# Patient Record
Sex: FEMALE | Race: WHITE
[De-identification: names, ages, dates, MRNs, and addresses within clinical notes are randomized per-mention and may not be internally consistent; named-entity substitution may affect disease eponyms.]

---

## 2020-01-04 ENCOUNTER — HOSPITAL ENCOUNTER (EMERGENCY)
Dept: HOSPITAL 75 - ER | Age: 25
Discharge: HOME | End: 2020-01-04
Payer: SELF-PAY

## 2020-01-04 VITALS — DIASTOLIC BLOOD PRESSURE: 78 MMHG | SYSTOLIC BLOOD PRESSURE: 132 MMHG

## 2020-01-04 VITALS — WEIGHT: 138.89 LBS | BODY MASS INDEX: 21.3 KG/M2 | HEIGHT: 67.72 IN

## 2020-01-04 DIAGNOSIS — Z87.891: ICD-10-CM

## 2020-01-04 DIAGNOSIS — O99.611: Primary | ICD-10-CM

## 2020-01-04 DIAGNOSIS — Z3A.01: ICD-10-CM

## 2020-01-04 DIAGNOSIS — K02.9: ICD-10-CM

## 2020-01-04 PROCEDURE — 99283 EMERGENCY DEPT VISIT LOW MDM: CPT

## 2020-01-04 NOTE — ED EENT
History of Present Illness


General


Chief Complaint:  Dental Problems/Pain


Stated Complaint:  TOOTHACHE 4 1/2 WKS PREGNANT


Nursing Triage Note:  


PT PRESENTS TO ED WITH COMPLAINTS OF L UPPER DENTAL PAIN STARTING AT 1600. PT 


REPORTS SHE HAS HAD ISSUES WITH HER TEETH BUT HAS BEEN UNBALE TO GET AN 


APPOINTMENT AT THE DENTAL CLINIC. PT ALSO REPORTS BEING APROX 5 WEEKS PREG


Source:  patient


Exam Limitations:  no limitations





History of Present Illness


Date Seen by Provider:  Jan 4, 2020


Time Seen by Provider:  18:05


Initial Comments


To ER with left lower dental pain since earlier today. She's had a broken left 

molar for quite some time but only became painful today. Additionally she is 

about 5 weeks pregnant.


Timing/Duration:  abrupt


Severity:  moderate


Location:  dental


Associated Symptoms:  denies symptoms





Allergies and Home Medications


Allergies


Coded Allergies:  


     No Known Drug Allergies (Unverified , 8/1/11)





Home Medications


No Active Prescriptions or Reported Meds





Patient Home Medication List


Home Medication List Reviewed:  Yes





Review of Systems


Review of Systems


Constitutional:  see HPI


Eyes:  No Symptoms Reported


Ears:  No Symptoms Reported


Nose:  no symptoms reported


Mouth:  see HPI, pain


Throat:  no symptoms reported


Respiratory:  no symptoms reported


Cardiovascular:  no symptoms reported


Musculoskeletal:  no symptoms reported





Past Medical-Social-Family Hx


Patient Social History


Alcohol Use:  Denies Use


Recreational Drug Use:  No


Smoking Status:  Former Smoker


Type Used:  Cigarettes


Former Smoker, Quit:  Jan 4, 2020


Recent Foreign Travel:  No


Contact w/Someone Who Travel:  No


Recent Infectious Disease Expo:  No


Recent Hopitalizations:  No


Physical Abuse:  No


Sexual Abuse:  No


Mistreated:  No


Fear:  No





Immunizations Up To Date


Tetanus Booster (TDap):  Unknown


PED Vaccines UTD:  Yes


Date of Influenza Vaccine:  Oct 21, 2015





Seasonal Allergies


Seasonal Allergies:  No





Past Medical History


Surgeries:  No


Respiratory:  No


Cardiac:  No


Neurological:  No


Reproductive Disorders:  No


Female Reproductive Disorders:  Denies


Sexually Transmitted Disease:  No


HIV/AIDS:  No


Genitourinary:  No


Gastrointestinal:  No


Musculoskeletal:  No


Endocrine:  No


Cancer:  No


Psychosocial:  No


Integumentary:  No


Blood Disorders:  No


Adverse Reaction/Blood Tranf:  No





Family Medical History





FH: bipolar disorder


  19 MOTHER


FH: brain tumor


  18 FATHER


Thyroid disease


  G8 SISTER





Physical Exam


Vital Signs





Vital Signs - First Documented








 1/4/20





 17:46


 


Temp 35.8


 


Pulse 127


 


Resp 18


 


B/P (MAP) 156/97 (116)


 


Pulse Ox 97








Height, Weight, BMI


Height: 5'7"


Weight: 160lbs. 0.0oz. [id number]; 21.00 BMI


Method:Stated


General Appearance:  WD/WN, no apparent distress


Eyes:  bilateral eye normal inspection, bilateral eye PERRL, bilateral eye EOMI


Ears:  bilateral ear auricle normal, bilateral ear canal normal, bilateral ear 

TM normal


Mouth/Throat:  normal mouth inspection, other (carious tender left molar lower. 

No surrounding abscess that is palpable)


Neck:  non-tender, full range of motion


Cardiovascular:  regular rate, rhythm, no murmur


Respiratory:  normal breath sounds, no respiratory distress, no accessory muscle

use


Gastrointestinal:  normal bowel sounds, non tender


Neurologic/Psychiatric:  alert, normal mood/affect, oriented x 3


Skin:  normal color, warm/dry


I offered her a inferior alveolar nerve block which she accepted, bupivacaine 

0.5% without epinephrine 2 mL was used.





Progress/Results/Core Measures


Results/Orders


My Orders





Orders - YAMINI PRICE


Amoxicillin Capsule (Polymox Capsule) (1/4/20 18:03)


Rx-Acetaminophen/Codeine (Rx-Tylenol #3) (1/4/20 18:15)





Vital Signs/I&O











 1/4/20





 17:46


 


Temp 35.8


 


Pulse 127


 


Resp 18


 


B/P (MAP) 156/97 (116)


 


Pulse Ox 97














Blood Pressure Mean:                    116











Departure


Impression





   Primary Impression:  


   Dental caries extending into dentine


   Additional Impression:  


   Pain, dental


Disposition:  01 HOME, SELF-CARE


Condition:  Improved





Departure-Patient Inst.


Referrals:  


NO,LOCAL PHYSICIAN (PCP/Family)


Primary Care Physician


Patient Instructions:  Dental Pain





Add. Discharge Instructions:  


1. Use the wax provided to cover this open area of the broken tooth as needed. 

Call your dentist Monday for follow-up. Antibiotics and pain medication in the 

meantime.





All discharge instructions reviewed with patient and/or family. Voiced 

understanding.


Scripts


Acetaminophen with Codeine (Tylenol with Codeine #3 Tablet) 1 Each Tablet


1 EACH PO Q6H PRN for PAIN-SEVERE (8-10) for 7 Days, #10 TAB


   Prov: YAMINI PRICE         1/4/20 


Amoxicillin (Amoxicillin) 500 Mg Capsule


500 MG PO TID, #21 CAP 0 Refills


   Prov: YAMINI PRICE         1/4/20











YAMINI PRICE              Jan 4, 2020 18:10

## 2020-08-07 ENCOUNTER — HOSPITAL ENCOUNTER (INPATIENT)
Dept: HOSPITAL 75 - WSO | Age: 25
LOS: 2 days | Discharge: HOME | End: 2020-08-09
Attending: OBSTETRICS & GYNECOLOGY | Admitting: OBSTETRICS & GYNECOLOGY
Payer: MEDICAID

## 2020-08-07 VITALS — SYSTOLIC BLOOD PRESSURE: 121 MMHG | DIASTOLIC BLOOD PRESSURE: 68 MMHG

## 2020-08-07 VITALS — DIASTOLIC BLOOD PRESSURE: 82 MMHG | SYSTOLIC BLOOD PRESSURE: 120 MMHG

## 2020-08-07 VITALS — SYSTOLIC BLOOD PRESSURE: 113 MMHG | DIASTOLIC BLOOD PRESSURE: 72 MMHG

## 2020-08-07 VITALS — DIASTOLIC BLOOD PRESSURE: 60 MMHG | SYSTOLIC BLOOD PRESSURE: 116 MMHG

## 2020-08-07 VITALS — DIASTOLIC BLOOD PRESSURE: 55 MMHG | SYSTOLIC BLOOD PRESSURE: 113 MMHG

## 2020-08-07 VITALS — SYSTOLIC BLOOD PRESSURE: 112 MMHG | DIASTOLIC BLOOD PRESSURE: 69 MMHG

## 2020-08-07 VITALS — SYSTOLIC BLOOD PRESSURE: 118 MMHG | DIASTOLIC BLOOD PRESSURE: 77 MMHG

## 2020-08-07 VITALS — BODY MASS INDEX: 28.13 KG/M2 | HEIGHT: 67.99 IN | WEIGHT: 185.63 LBS

## 2020-08-07 VITALS — SYSTOLIC BLOOD PRESSURE: 113 MMHG | DIASTOLIC BLOOD PRESSURE: 71 MMHG

## 2020-08-07 VITALS — SYSTOLIC BLOOD PRESSURE: 113 MMHG | DIASTOLIC BLOOD PRESSURE: 64 MMHG

## 2020-08-07 VITALS — DIASTOLIC BLOOD PRESSURE: 64 MMHG | SYSTOLIC BLOOD PRESSURE: 116 MMHG

## 2020-08-07 VITALS — SYSTOLIC BLOOD PRESSURE: 141 MMHG | DIASTOLIC BLOOD PRESSURE: 80 MMHG

## 2020-08-07 VITALS — DIASTOLIC BLOOD PRESSURE: 66 MMHG | SYSTOLIC BLOOD PRESSURE: 121 MMHG

## 2020-08-07 VITALS — DIASTOLIC BLOOD PRESSURE: 72 MMHG | SYSTOLIC BLOOD PRESSURE: 126 MMHG

## 2020-08-07 VITALS — SYSTOLIC BLOOD PRESSURE: 115 MMHG | DIASTOLIC BLOOD PRESSURE: 70 MMHG

## 2020-08-07 VITALS — SYSTOLIC BLOOD PRESSURE: 111 MMHG | DIASTOLIC BLOOD PRESSURE: 70 MMHG

## 2020-08-07 VITALS — SYSTOLIC BLOOD PRESSURE: 123 MMHG | DIASTOLIC BLOOD PRESSURE: 71 MMHG

## 2020-08-07 VITALS — SYSTOLIC BLOOD PRESSURE: 124 MMHG | DIASTOLIC BLOOD PRESSURE: 81 MMHG

## 2020-08-07 VITALS — DIASTOLIC BLOOD PRESSURE: 71 MMHG | SYSTOLIC BLOOD PRESSURE: 127 MMHG

## 2020-08-07 VITALS — SYSTOLIC BLOOD PRESSURE: 129 MMHG | DIASTOLIC BLOOD PRESSURE: 77 MMHG

## 2020-08-07 VITALS — DIASTOLIC BLOOD PRESSURE: 66 MMHG | SYSTOLIC BLOOD PRESSURE: 124 MMHG

## 2020-08-07 VITALS — DIASTOLIC BLOOD PRESSURE: 77 MMHG | SYSTOLIC BLOOD PRESSURE: 141 MMHG

## 2020-08-07 VITALS — DIASTOLIC BLOOD PRESSURE: 75 MMHG | SYSTOLIC BLOOD PRESSURE: 112 MMHG

## 2020-08-07 VITALS — DIASTOLIC BLOOD PRESSURE: 58 MMHG | SYSTOLIC BLOOD PRESSURE: 109 MMHG

## 2020-08-07 VITALS — DIASTOLIC BLOOD PRESSURE: 71 MMHG | SYSTOLIC BLOOD PRESSURE: 124 MMHG

## 2020-08-07 VITALS — SYSTOLIC BLOOD PRESSURE: 122 MMHG | DIASTOLIC BLOOD PRESSURE: 61 MMHG

## 2020-08-07 VITALS — SYSTOLIC BLOOD PRESSURE: 151 MMHG | DIASTOLIC BLOOD PRESSURE: 71 MMHG

## 2020-08-07 VITALS — SYSTOLIC BLOOD PRESSURE: 122 MMHG | DIASTOLIC BLOOD PRESSURE: 74 MMHG

## 2020-08-07 VITALS — SYSTOLIC BLOOD PRESSURE: 140 MMHG | DIASTOLIC BLOOD PRESSURE: 67 MMHG

## 2020-08-07 VITALS — SYSTOLIC BLOOD PRESSURE: 137 MMHG | DIASTOLIC BLOOD PRESSURE: 74 MMHG

## 2020-08-07 VITALS — DIASTOLIC BLOOD PRESSURE: 70 MMHG | SYSTOLIC BLOOD PRESSURE: 125 MMHG

## 2020-08-07 VITALS — SYSTOLIC BLOOD PRESSURE: 128 MMHG | DIASTOLIC BLOOD PRESSURE: 67 MMHG

## 2020-08-07 VITALS — DIASTOLIC BLOOD PRESSURE: 77 MMHG | SYSTOLIC BLOOD PRESSURE: 134 MMHG

## 2020-08-07 VITALS — DIASTOLIC BLOOD PRESSURE: 84 MMHG | SYSTOLIC BLOOD PRESSURE: 130 MMHG

## 2020-08-07 VITALS — SYSTOLIC BLOOD PRESSURE: 124 MMHG | DIASTOLIC BLOOD PRESSURE: 69 MMHG

## 2020-08-07 DIAGNOSIS — Z23: ICD-10-CM

## 2020-08-07 DIAGNOSIS — Z3A.35: ICD-10-CM

## 2020-08-07 LAB
APTT PPP: YELLOW S
BACTERIA #/AREA URNS HPF: (no result) /HPF
BASOPHILS # BLD AUTO: 0 10^3/UL (ref 0–0.1)
BASOPHILS # BLD AUTO: 0 10^3/UL (ref 0–0.1)
BASOPHILS NFR BLD AUTO: 0 % (ref 0–10)
BASOPHILS NFR BLD AUTO: 0 % (ref 0–10)
BILIRUB UR QL STRIP: NEGATIVE
EOSINOPHIL # BLD AUTO: 0.1 10^3/UL (ref 0–0.3)
EOSINOPHIL # BLD AUTO: 0.1 10^3/UL (ref 0–0.3)
EOSINOPHIL NFR BLD AUTO: 1 % (ref 0–10)
EOSINOPHIL NFR BLD AUTO: 1 % (ref 0–10)
ERYTHROCYTE [DISTWIDTH] IN BLOOD BY AUTOMATED COUNT: 11.9 % (ref 10–14.5)
ERYTHROCYTE [DISTWIDTH] IN BLOOD BY AUTOMATED COUNT: 12 % (ref 10–14.5)
FIBRINOGEN PPP-MCNC: CLEAR MG/DL
GLUCOSE UR STRIP-MCNC: NEGATIVE MG/DL
HCT VFR BLD CALC: 31 % (ref 35–52)
HCT VFR BLD CALC: 33 % (ref 35–52)
HGB BLD-MCNC: 10.7 G/DL (ref 11.5–16)
HGB BLD-MCNC: 11.5 G/DL (ref 11.5–16)
KETONES UR QL STRIP: NEGATIVE
LEUKOCYTE ESTERASE UR QL STRIP: (no result)
LYMPHOCYTES # BLD AUTO: 2.4 X 10^3 (ref 1–4)
LYMPHOCYTES # BLD AUTO: 2.6 X 10^3 (ref 1–4)
LYMPHOCYTES NFR BLD AUTO: 16 % (ref 12–44)
LYMPHOCYTES NFR BLD AUTO: 19 % (ref 12–44)
MANUAL DIFFERENTIAL PERFORMED BLD QL: NO
MANUAL DIFFERENTIAL PERFORMED BLD QL: NO
MCH RBC QN AUTO: 32 PG (ref 25–34)
MCH RBC QN AUTO: 32 PG (ref 25–34)
MCHC RBC AUTO-ENTMCNC: 34 G/DL (ref 32–36)
MCHC RBC AUTO-ENTMCNC: 34 G/DL (ref 32–36)
MCV RBC AUTO: 94 FL (ref 80–99)
MCV RBC AUTO: 94 FL (ref 80–99)
MONOCYTES # BLD AUTO: 1.1 X 10^3 (ref 0–1)
MONOCYTES # BLD AUTO: 1.3 X 10^3 (ref 0–1)
MONOCYTES NFR BLD AUTO: 8 % (ref 0–12)
MONOCYTES NFR BLD AUTO: 9 % (ref 0–12)
NEUTROPHILS # BLD AUTO: 10.2 X 10^3 (ref 1.8–7.8)
NEUTROPHILS # BLD AUTO: 11 X 10^3 (ref 1.8–7.8)
NEUTROPHILS NFR BLD AUTO: 73 % (ref 42–75)
NEUTROPHILS NFR BLD AUTO: 75 % (ref 42–75)
NITRITE UR QL STRIP: NEGATIVE
PH UR STRIP: 7 [PH] (ref 5–9)
PLATELET # BLD: 262 10^3/UL (ref 130–400)
PLATELET # BLD: 264 10^3/UL (ref 130–400)
PMV BLD AUTO: 8.9 FL (ref 7.4–10.4)
PMV BLD AUTO: 9.1 FL (ref 7.4–10.4)
PROT UR QL STRIP: NEGATIVE
RBC #/AREA URNS HPF: (no result) /HPF
SP GR UR STRIP: 1.01 (ref 1.02–1.02)
SQUAMOUS #/AREA URNS HPF: (no result) /HPF
WBC # BLD AUTO: 14 10^3/UL (ref 4.3–11)
WBC # BLD AUTO: 14.8 10^3/UL (ref 4.3–11)
WBC #/AREA URNS HPF: (no result) /HPF

## 2020-08-07 PROCEDURE — 86900 BLOOD TYPING SEROLOGIC ABO: CPT

## 2020-08-07 PROCEDURE — 90715 TDAP VACCINE 7 YRS/> IM: CPT

## 2020-08-07 PROCEDURE — 86850 RBC ANTIBODY SCREEN: CPT

## 2020-08-07 PROCEDURE — 36415 COLL VENOUS BLD VENIPUNCTURE: CPT

## 2020-08-07 PROCEDURE — 0HQ9XZZ REPAIR PERINEUM SKIN, EXTERNAL APPROACH: ICD-10-PCS | Performed by: OBSTETRICS & GYNECOLOGY

## 2020-08-07 PROCEDURE — 87088 URINE BACTERIA CULTURE: CPT

## 2020-08-07 PROCEDURE — 81000 URINALYSIS NONAUTO W/SCOPE: CPT

## 2020-08-07 PROCEDURE — 85025 COMPLETE CBC W/AUTO DIFF WBC: CPT

## 2020-08-07 PROCEDURE — 99212 OFFICE O/P EST SF 10 MIN: CPT

## 2020-08-07 PROCEDURE — 86901 BLOOD TYPING SEROLOGIC RH(D): CPT

## 2020-08-07 RX ADMIN — SODIUM CHLORIDE, SODIUM LACTATE, POTASSIUM CHLORIDE, CALCIUM CHLORIDE, AND DEXTROSE MONOHYDRATE SCH MLS/HR: 600; 310; 30; 20; 5 INJECTION, SOLUTION INTRAVENOUS at 13:35

## 2020-08-07 RX ADMIN — KETOROLAC TROMETHAMINE SCH MG: 30 INJECTION, SOLUTION INTRAMUSCULAR; INTRAVENOUS at 22:23

## 2020-08-07 RX ADMIN — SODIUM CHLORIDE, SODIUM LACTATE, POTASSIUM CHLORIDE, CALCIUM CHLORIDE, AND DEXTROSE MONOHYDRATE SCH MLS/HR: 600; 310; 30; 20; 5 INJECTION, SOLUTION INTRAVENOUS at 13:34

## 2020-08-07 RX ADMIN — DOCUSATE SODIUM SCH MG: 100 CAPSULE ORAL at 22:22

## 2020-08-07 RX ADMIN — WATER SCH MLS/HR: 1 INJECTION INTRAMUSCULAR; INTRAVENOUS; SUBCUTANEOUS at 21:31

## 2020-08-07 RX ADMIN — WATER SCH MLS/HR: 1 INJECTION INTRAMUSCULAR; INTRAVENOUS; SUBCUTANEOUS at 17:24

## 2020-08-07 NOTE — NUR
Facundo Montoya, PEDRO and MARY here for epidural placement. Procedure explained, consent 
reviewed and signed by anesthesia.  Questions answered to patient's satisfaction. Time out 
taken to verify correct patient/procedure. Patient up to side of bed, assisted into sitting 
position.  Betadine prep done x3 and sterile drape applied.  Local done, see anesthesia 
record.  Test dose given, see anesthesia record for drug and dosage.  Epidural catheter 
secured in place.  Epidural placement complete.  Assisted back into bed, monitors adjusted.  
Epidural dosed, see anesthesia record.  Epidural of Sufenta/Bupvicaine @12cc/hr stated per 
pump.  Patient tolerated procedure well.

## 2020-08-07 NOTE — NUR
MADISON PEREZ presented to unit via  from ED, accompanied by family, with c/o CONTRACTIONS. 
MADISON PEREZ weighed, gowned, voided, and to bed.  EFHM and TOCO applied, VS taken.  
MADISON PEREZ oriented to bed controls, call light, TV, heat, and A/C controls.

## 2020-08-07 NOTE — HISTORY & PHYSICAL
History and Physical


Date Seen by Provider:  Aug 7, 2020


Time Seen by Provider:  16:23


This patient is a 25-year-old  2 para 1 white female with an EDC of 

2020.  She presented now at 35-6/7 weeks' gestation complaining of 

contractions pain and pressure.  She changed her cervix from 3 cm on initial 

presentation to now over 5 cm.  Pregnancy has been uncomplicated although her 

GBS culture done at 35 weeks gestation was positive.





Allergies are none





Medications are prenatal vitamins





Medical social and surgical histories are per the antepartum record





HEENT exam is normal


Neck is supple no lymphadenopathy no thyromegaly


Abdomen is gravid soft nontender nondistended


Extremities show no clubbing or cyanosis.  There is no Homans sign.





Pelvic exam is pending





Laboratory Tests


20 13:47








Assessment and plan   35+ week gestation with  labor in a patient with a 

GBS positive culture.  She's been started on ampicillin empirically for GBS 

prophylaxis and that will be continued every 4 hours until delivery.  We'll 

notify the pediatrician of the impending  delivery.  We anticipate a 

vaginal delivery under the cover of ampicillin for GBS prophylaxis


35+ weeks gestation with  labor





Allergies and Home Medications


Allergies


Coded Allergies:  


     No Known Drug Allergies (Unverified , 11)





Home Medications


Acetaminophen with Codeine 1 Each Tablet, 1 EACH PO Q6H PRN for PAIN-SEVERE (8-

10)


   Prescribed by: YAMINI PRICE on 20


Amoxicillin 500 Mg Capsule, 500 MG PO TID


   Prescribed by: YAMINI PRICE on 20





Patient Home Medication List


Home Medication List Reviewed:  Yes





Clinical Quality Measures


DVT/VTE Risk/Contraindication:


Risk Factor Score Per Nursin


RFS Level Per Nursing on Admit:  1=Low/No VTE PPX











WENDY ARGUETA MD        Aug 7, 2020 16:25

## 2020-08-07 NOTE — DISCHARGE INST-SURGICAL
Discharge Inst-Surgical


Depart Medication/Instructions


New, Converted or Re-Newed RX:  RX on Chart





Consults/Follow Up


Patient Instructions:  


As directed


Orders & Referrals





Follow Up Appt:


Call to make follow up appt. for patient in 4 weeks.





Activity 


Per routine post vaginal delivery instructions.





Please call in RX to patient pharmacy.





Diet as tolerated





Patient may shower or tub bathe as desired.





Activity


Activity as Tolerated:  No





Diet


Discharge Diet:  No Restrictions











WENDY ARGUETA MD        Aug 7, 2020 16:31

## 2020-08-08 VITALS — SYSTOLIC BLOOD PRESSURE: 129 MMHG | DIASTOLIC BLOOD PRESSURE: 73 MMHG

## 2020-08-08 VITALS — SYSTOLIC BLOOD PRESSURE: 124 MMHG | DIASTOLIC BLOOD PRESSURE: 86 MMHG

## 2020-08-08 VITALS — DIASTOLIC BLOOD PRESSURE: 72 MMHG | SYSTOLIC BLOOD PRESSURE: 109 MMHG

## 2020-08-08 VITALS — DIASTOLIC BLOOD PRESSURE: 80 MMHG | SYSTOLIC BLOOD PRESSURE: 115 MMHG

## 2020-08-08 VITALS — SYSTOLIC BLOOD PRESSURE: 120 MMHG | DIASTOLIC BLOOD PRESSURE: 73 MMHG

## 2020-08-08 VITALS — DIASTOLIC BLOOD PRESSURE: 67 MMHG | SYSTOLIC BLOOD PRESSURE: 113 MMHG

## 2020-08-08 VITALS — DIASTOLIC BLOOD PRESSURE: 67 MMHG | SYSTOLIC BLOOD PRESSURE: 128 MMHG

## 2020-08-08 RX ADMIN — IBUPROFEN SCH MG: 800 TABLET, FILM COATED ORAL at 16:13

## 2020-08-08 RX ADMIN — DOCUSATE SODIUM SCH MG: 100 CAPSULE ORAL at 08:37

## 2020-08-08 RX ADMIN — KETOROLAC TROMETHAMINE SCH MG: 30 INJECTION, SOLUTION INTRAMUSCULAR; INTRAVENOUS at 04:10

## 2020-08-08 RX ADMIN — IBUPROFEN SCH MG: 800 TABLET, FILM COATED ORAL at 22:03

## 2020-08-08 RX ADMIN — ACETAMINOPHEN AND CODEINE PHOSPHATE PRN TAB: 300; 30 TABLET ORAL at 19:17

## 2020-08-08 RX ADMIN — ACETAMINOPHEN AND CODEINE PHOSPHATE PRN TAB: 300; 30 TABLET ORAL at 11:44

## 2020-08-08 RX ADMIN — DOCUSATE SODIUM SCH MG: 100 CAPSULE ORAL at 19:17

## 2020-08-08 RX ADMIN — ACETAMINOPHEN AND CODEINE PHOSPHATE PRN TAB: 300; 30 TABLET ORAL at 02:03

## 2020-08-08 NOTE — NUR
Patient assisted to bathroom with standby assist. Positive void. Light rubra bleeding, no 
clots noted.

## 2020-08-08 NOTE — OPERATIVE REPORT
DATE OF SERVICE:  2020



DELIVERY NOTE



The patient delivered by  spontaneous vaginal delivery at 35+ weeks

gestation, a viable male infant with Apgars of 8 and 9 at 1 and 5 minutes

respectively, weight of 7 pounds and 4 ounces, birth time of 2149 and a cord

blood pH that is pending.



The infant was bulb suctioned on delivery of the head.  A single nuchal cord was

easily released.  The infant was delivered from that point atraumatically and

bulb suctioned again as it was dried and stimulated.  The infant with stats as

noted above.



When relatively pulseless, the umbilical cord was doubly clamped, father cut the

cord, the baby was passed to mom's abdomen.  Dr. Maldonado was on hand for delivery

due to the prematurity.  She attended the baby after delivery.



The placenta delivered fairly promptly spontaneously [place].  It was normal

with 3-vessel cord.  The placenta was sent to pathology for permanent section

due to  labor.



The cervix, vagina, rectum, and perineum were examined and found intact, except

for a first-degree posterior fourchette and posterior vaginal wall laceration

and a left labia majora laceration that entire laceration complex was repaired

with a single suture of 3-0 Vicryl Rapide in a running locked fashion to good

hemostasis and good reapproximation.



The patient tolerated the delivery and the repair well.  On completion of

delivery and the repair, the sponge and needle counts were correct.  Blood loss

was around 200 mL for the delivery.  The patient remained in the LDR for

recovery.  The baby remained with the mom.





Job ID: [id number]

DocumentID: [id number]

Dictated Date:  2020 22:08:18

Transcription Date: 2020 03:36:30

Dictated By: WENDY ARGUETA MD

## 2020-08-08 NOTE — NUR
Patient transferred to [place] [id number] via wheelchair. Accompanied by infant in crib and S.O. 
Patient oriented to room, call light, bed controls, dietary menu, and thermostat. PP folder 
given and explained. No questions or concerns voiced at this time.

## 2020-08-08 NOTE — PROGRESS NOTE
Standard Progress Note


Progress Notes/Assess & Plan


Date Seen by a Provider:  Aug 8, 2020


Time Seen by a Provider:  09:47


Progress/Assessment & Plan


This patient is without complaint.  She is ablating, voiding, tolerating oral 

intake well has good pain control.








Vital Signs








  Date Time  Temp Pulse Resp B/P (MAP) Pulse Ox O2 Delivery O2 Flow Rate FiO2


 


8/8/20 08:35 36.8 78 18 128/67 (87) 98 Room Air  


 


8/8/20 04:10 36.2 72 16 124/86 (99) 99 Room Air  


 


8/8/20 00:02 36.7 71 16 113/67 (82)  Room Air  


 


8/7/20 23:32  71 16 109/58 (75)  Room Air  


 


8/7/20 22:58  81 16 121/68 (85)  Room Air  


 


8/7/20 22:43  72 16 124/69 (87)  Room Air  


 


8/7/20 22:34  68 16 124/66 (85)  Room Air  


 


8/7/20 22:15  94 16 125/70 (88)  Room Air  


 


8/7/20 22:00 36.4 92 16 140/67 (91)  Room Air  


 


8/7/20 21:30  74 16 111/70 (84) 99 Room Air  


 


8/7/20 21:15  78 16 115/70 (85) 100 Room Air  


 


8/7/20 21:00  79 16 113/64 (80) 100 Room Air  


 


8/7/20 20:45  99 16 113/72 (86) 99 Room Air  


 


8/7/20 20:30  86 16 115/70 (85) 99 Room Air  


 


8/7/20 20:10  89 16 122/61 (81) 99 Room Air  


 


8/7/20 20:05  76 16 113/71 (85) 99 Room Air  


 


8/7/20 20:01  102 16 113/55 (74) 100 Room Air  


 


8/7/20 19:58  121 16 116/60 (78) 100 Room Air  


 


8/7/20 19:55  85 16 128/67 (87) 100 Room Air  


 


8/7/20 19:52  109 16 121/66 (84) 100 Room Air  


 


8/7/20 19:49  109 16 116/64 (81) 100 Room Air  


 


8/7/20 19:46  84 18 124/71 (88) 99 Room Air  


 


8/7/20 19:43  85 18 127/71 (89) 99 Room Air  


 


8/7/20 19:40 36.6 85 18 134/77 (96) 99 Room Air  


 


8/7/20 19:35  89 18 126/72 (90) 99 Room Air  


 


8/7/20 19:30  100 18 129/77 (94) 100 Room Air  


 


8/7/20 19:27  93 18 129/77 (94) 100 Room Air  


 


8/7/20 19:25  90 18 141/80 (100) 100 Room Air  


 


8/7/20 19:22  94 18 137/74 (95) 99 Room Air  


 


8/7/20 19:17  94 18 141/77 (98) 100 Room Air  


 


8/7/20 19:10  90 18 130/84 (99) 100 Room Air  


 


8/7/20 18:40  95 20 151/71 (97)  Room Air  


 


8/7/20 18:25  83 20 118/77 (91) 100 Room Air  


 


8/7/20 18:10  81 20 113/72 (86) 100 Room Air  


 


8/7/20 17:55  73 20 122/74 (90)  Room Air  


 


8/7/20 17:25  77 20 112/75 (87)  Room Air  


 


8/7/20 17:10 37.0 75 20 124/81 (95)  Room Air  


 


8/7/20 15:45 36.7 78 20 120/82 (95) 100 Room Air  


 


8/7/20 13:24 36.7 80 20 123/71 (88) 100 Room Air  


 


8/7/20 10:43 36.6 85 20  98 Room Air  


 


8/7/20 10:37 36.7 85 20  98 Room Air  














I & O 


 


 8/8/20





 07:00


 


Intake Total 1529.6 ml


 


Balance 1529.6 ml





Vital signs are stable.  Patient is afebrile.





Fundus is firm below the umbilicus and nontender.


Extremities show no clubbing cyanosis.  There is no Homans sign.





Assessment and plan   postpartum day number 1 doing well.  Plan is for routine 

convalescence.











WENDY ARGUETA MD        Aug 8, 2020 09:48

## 2020-08-09 VITALS — DIASTOLIC BLOOD PRESSURE: 76 MMHG | SYSTOLIC BLOOD PRESSURE: 109 MMHG

## 2020-08-09 VITALS — SYSTOLIC BLOOD PRESSURE: 127 MMHG | DIASTOLIC BLOOD PRESSURE: 87 MMHG

## 2020-08-09 RX ADMIN — IBUPROFEN SCH MG: 800 TABLET, FILM COATED ORAL at 04:23

## 2020-08-09 RX ADMIN — DOCUSATE SODIUM SCH MG: 100 CAPSULE ORAL at 09:53

## 2020-08-09 RX ADMIN — IBUPROFEN SCH MG: 800 TABLET, FILM COATED ORAL at 09:53

## 2020-08-09 RX ADMIN — ACETAMINOPHEN AND CODEINE PHOSPHATE PRN TAB: 300; 30 TABLET ORAL at 01:22

## 2020-08-09 NOTE — NUR
DISCHARGE INSTRUCTIONS EXPLAINED TO PT WITH COPY PROVIDED TO PT ALONG WITH NARCOTIC 
PRESCRIPTION. PT NOTIFIED OF SCRIPTS CALLED TO [facility]. PT ALSO NOTIFIED 
OF NEED TO SCHEDULE FOLLOW UP APPT. PT VERBALIZES UNDERSTANDING OF INSTRUCTIONS AND SIGNS TO 
VERIFY. PT DENIES NEEDS OR CONCERNS AT THIS TIME


-------------------------------------------------------------------------------

Addendum: 08/09/20 at 1340 by HENRY REYES RN

-------------------------------------------------------------------------------

TIME IS ERROR. EVENT OCCURRED AT 1304.

## 2020-08-09 NOTE — NUR
PT AMBULATES OFF UNIT TO PRIVATE VEHICLE, ACCOMPANIED BY RN, S.O., INFANT AND ALL PERSONAL 
BELONGINGS. NO S/S OF DISTRESS NOTED.

## 2020-08-09 NOTE — PROGRESS NOTE
Standard Progress Note


Progress Notes/Assess & Plan


Date Seen by a Provider:  Aug 9, 2020


Time Seen by a Provider:  08:31


Progress/Assessment & Plan


This patient is without complaint.  She is ablating, voiding, tolerating oral 

intake well has good pain control.








Vital Signs








  Date Time  Temp Pulse Resp B/P (MAP) Pulse Ox O2 Delivery O2 Flow Rate FiO2


 


8/8/20 08:35 36.8 78 18 128/67 (87) 98 Room Air  


 


8/8/20 04:10 36.2 72 16 124/86 (99) 99 Room Air  


 


8/8/20 00:02 36.7 71 16 113/67 (82)  Room Air  


 


8/7/20 23:32  71 16 109/58 (75)  Room Air  


 


8/7/20 22:58  81 16 121/68 (85)  Room Air  


 


8/7/20 22:43  72 16 124/69 (87)  Room Air  


 


8/7/20 22:34  68 16 124/66 (85)  Room Air  


 


8/7/20 22:15  94 16 125/70 (88)  Room Air  


 


8/7/20 22:00 36.4 92 16 140/67 (91)  Room Air  


 


8/7/20 21:30  74 16 111/70 (84) 99 Room Air  


 


8/7/20 21:15  78 16 115/70 (85) 100 Room Air  


 


8/7/20 21:00  79 16 113/64 (80) 100 Room Air  


 


8/7/20 20:45  99 16 113/72 (86) 99 Room Air  


 


8/7/20 20:30  86 16 115/70 (85) 99 Room Air  


 


8/7/20 20:10  89 16 122/61 (81) 99 Room Air  


 


8/7/20 20:05  76 16 113/71 (85) 99 Room Air  


 


8/7/20 20:01  102 16 113/55 (74) 100 Room Air  


 


8/7/20 19:58  121 16 116/60 (78) 100 Room Air  


 


8/7/20 19:55  85 16 128/67 (87) 100 Room Air  


 


8/7/20 19:52  109 16 121/66 (84) 100 Room Air  


 


8/7/20 19:49  109 16 116/64 (81) 100 Room Air  


 


8/7/20 19:46  84 18 124/71 (88) 99 Room Air  


 


8/7/20 19:43  85 18 127/71 (89) 99 Room Air  


 


8/7/20 19:40 36.6 85 18 134/77 (96) 99 Room Air  


 


8/7/20 19:35  89 18 126/72 (90) 99 Room Air  


 


8/7/20 19:30  100 18 129/77 (94) 100 Room Air  


 


8/7/20 19:27  93 18 129/77 (94) 100 Room Air  


 


8/7/20 19:25  90 18 141/80 (100) 100 Room Air  


 


8/7/20 19:22  94 18 137/74 (95) 99 Room Air  


 


8/7/20 19:17  94 18 141/77 (98) 100 Room Air  


 


8/7/20 19:10  90 18 130/84 (99) 100 Room Air  


 


8/7/20 18:40  95 20 151/71 (97)  Room Air  


 


8/7/20 18:25  83 20 118/77 (91) 100 Room Air  


 


8/7/20 18:10  81 20 113/72 (86) 100 Room Air  


 


8/7/20 17:55  73 20 122/74 (90)  Room Air  


 


8/7/20 17:25  77 20 112/75 (87)  Room Air  


 


8/7/20 17:10 37.0 75 20 124/81 (95)  Room Air  


 


8/7/20 15:45 36.7 78 20 120/82 (95) 100 Room Air  


 


8/7/20 13:24 36.7 80 20 123/71 (88) 100 Room Air  


 


8/7/20 10:43 36.6 85 20  98 Room Air  


 


8/7/20 10:37 36.7 85 20  98 Room Air  














I & O 


 


 8/8/20





 07:00


 


Intake Total 1529.6 ml


 


Balance 1529.6 ml





Vital signs are stable.  Patient is afebrile.





Fundus is firm below the umbilicus and nontender.


Extremities show no clubbing cyanosis.  There is no Homans sign.





Assessment and plan   postpartum day number 1 doing well.  Plan is for routine 

convalescence.





August 9, 2020


Patient is without complaint.  She is ambulating, voiding, tolerating oral 

intake well has good pain control.  Patient is requesting discharge home.








Vital Signs








  Date Time  Temp Pulse Resp B/P (MAP) Pulse Ox O2 Delivery O2 Flow Rate FiO2


 


8/9/20 04:20 36.7 67 18 109/76 (87) 99 Room Air  


 


8/8/20 22:05 36.4 73 18 129/73 (91) 99 Room Air  


 


8/8/20 19:40 36.3 72 18 109/72 (84) 98 Room Air  


 


8/8/20 16:11 36.5 65 18 115/80 (92) 98 Room Air  


 


8/8/20 11:43 36.6 63 18 120/73 (89) 98 Room Air  


 


8/8/20 08:35 36.8 78 18 128/67 (87) 98 Room Air  





Vital signs are stable.  Patient is afebrile.





Fundus is firm below the umbilicus and nontender.


Extremities show no clubbing or cyanosis.  There is no Homans sign.





Assessment and plan   postpartum day number 2 status post vaginal delivery at 

35+ weeks gestation.  Patient is doing well and will be discharged home


Final Diagnosis


35 week spontaneous vaginal delivery











WENDY ARGUETA MD        Aug 9, 2020 08:32

## 2020-08-09 NOTE — ANESTHESIA-REGIONAL POST-OP
Regional


Patient Condition


Mental Status:  Alert, Oriented x3


Circulation:  Same as Pre-Op


Headache:  Absent


Sensation:  Full Recovery


Motor Block:  Absent





Post Op Complications


Complications


None





Follow Up Care/Instructions


Patient Instructions


None needed.





Anesthesia/Patient Condition


Patient is doing well, no complaints, stable vital signs, no apparent adverse 

anesthesia problems.   


No complications reported per nursing.











PALOMO BRYAN CRNA           Aug 9, 2020 06:47